# Patient Record
Sex: FEMALE | Race: WHITE | NOT HISPANIC OR LATINO | URBAN - METROPOLITAN AREA
[De-identification: names, ages, dates, MRNs, and addresses within clinical notes are randomized per-mention and may not be internally consistent; named-entity substitution may affect disease eponyms.]

---

## 2018-08-03 ENCOUNTER — OFFICE (OUTPATIENT)
Dept: URBAN - METROPOLITAN AREA CLINIC 11 | Facility: CLINIC | Age: 28
End: 2018-08-03

## 2018-08-22 ENCOUNTER — OFFICE (OUTPATIENT)
Dept: URBAN - METROPOLITAN AREA CLINIC 19 | Facility: CLINIC | Age: 28
End: 2018-08-22

## 2018-08-22 VITALS
HEIGHT: 59 IN | WEIGHT: 134 LBS | DIASTOLIC BLOOD PRESSURE: 89 MMHG | SYSTOLIC BLOOD PRESSURE: 120 MMHG | HEART RATE: 74 BPM

## 2018-08-22 DIAGNOSIS — K21.9 GASTRO-ESOPHAGEAL REFLUX DISEASE WITHOUT ESOPHAGITIS: ICD-10-CM

## 2018-08-22 DIAGNOSIS — K58.9 IRRITABLE BOWEL SYNDROME WITHOUT DIARRHEA: ICD-10-CM

## 2018-08-22 DIAGNOSIS — A04.72 ENTEROCOLITIS DUE TO CLOSTRIDIUM DIFFICILE, NOT SPECIFIED AS: ICD-10-CM

## 2018-08-22 DIAGNOSIS — R14.0 ABDOMINAL DISTENSION (GASEOUS): ICD-10-CM

## 2018-08-22 DIAGNOSIS — E66.3 OVERWEIGHT: ICD-10-CM

## 2018-08-22 PROCEDURE — 99214 OFFICE O/P EST MOD 30 MIN: CPT | Performed by: INTERNAL MEDICINE

## 2018-08-22 NOTE — SERVICENOTES
The patient is here for follow-up of C diff.  She is doing much better after a two week course of oral vancomycin.  From my standpoint she can return back to work.  Will give her a stool sample kit to bring back to us if her diarrhea returns but if she continues to have normal bowel movements she does not need to return any stool.  She was told to notify us if she has recurrence of persistent diarrhea.  In the meantime I did recommend that she continue work on diet, exercise, and weight loss.  We gave her instructions on reflux as well as gas and bloating.  I did recommend she continue her probiotic in can continue this indefinitely if she would like.  I recommend that she take over-the-counter H2 blockers as needed for reflux.  If this does not work for her or if reflux worsens she should notify us and we can try PPI or have her undergo further endoscopic evaluation.

## 2018-08-22 NOTE — SERVICEHPINOTES
Ms. Bui (Zachary) is a 28-year-old female here for follow-up of C diff. The patient was initially seen by me in August 2016 when she was having some issues with abdominal pain, change in bowel habits, and reflux. Prior to seeing me she had CT scan at the emergency department that was negative except for some evidence of possible enteritis. Blood work was normal at that time. We checked celiac labs and CRP which were normal. Stool studies were also normal. She underwent colonoscopy which was normal to the terminal ileum with normal colon biopsies. She was then lost to follow-up but then call back two weeks ago because of some worsening abdominal pain, bloating, and diarrhea. She did have appendectomy for appendicitis in May 2018. Since then she had some irregular bowel habits which worsened in because of worsening pain and diarrhea went to the ED in early August where CT scan was again normal. Blood work was also normal at that time. We had her bring in a stool sample which was positive for C diff. Because of this we placed her on vancomycin for two week course which she has nearly completed. She now comes in stating that she is doing much better. She has 1-2 formed bowel movements a day. She states her bloating is much better but still present at times. She also has some mild reflux which occurs every 1-2 days for which she does not take anything. Her weight has increased 10 pounds since her initial visit two years ago.